# Patient Record
Sex: FEMALE | Race: WHITE | Employment: UNEMPLOYED | URBAN - METROPOLITAN AREA
[De-identification: names, ages, dates, MRNs, and addresses within clinical notes are randomized per-mention and may not be internally consistent; named-entity substitution may affect disease eponyms.]

---

## 2021-05-21 ENCOUNTER — HOSPITAL ENCOUNTER (EMERGENCY)
Age: 26
Discharge: HOME | End: 2021-05-21
Attending: EMERGENCY MEDICINE
Payer: COMMERCIAL

## 2021-05-21 VITALS
DIASTOLIC BLOOD PRESSURE: 64 MMHG | RESPIRATION RATE: 17 BRPM | SYSTOLIC BLOOD PRESSURE: 103 MMHG | WEIGHT: 170 LBS | OXYGEN SATURATION: 98 % | TEMPERATURE: 98.8 F | HEART RATE: 72 BPM

## 2021-05-21 DIAGNOSIS — S09.90XA INJURY OF HEAD, INITIAL ENCOUNTER: Primary | ICD-10-CM

## 2021-05-21 PROCEDURE — 99284 EMERGENCY DEPT VISIT MOD MDM: CPT
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
## 2021-08-13 ENCOUNTER — OFFICE VISIT (OUTPATIENT)
Dept: URGENT CARE | Age: 26
End: 2021-08-13
Payer: COMMERCIAL

## 2021-08-13 VITALS — TEMPERATURE: 98.3 F | OXYGEN SATURATION: 99 % | RESPIRATION RATE: 16 BRPM | HEART RATE: 85 BPM

## 2021-08-13 DIAGNOSIS — R05.9 COUGH: Primary | ICD-10-CM

## 2021-08-13 DIAGNOSIS — Z11.52 ENCOUNTER FOR SCREENING FOR COVID-19: ICD-10-CM

## 2021-08-13 LAB — SARS-COV-2 POC: NEGATIVE

## 2021-08-13 PROCEDURE — 87426 SARSCOV CORONAVIRUS AG IA: CPT | Performed by: NURSE PRACTITIONER

## 2021-08-13 PROCEDURE — 99203 OFFICE O/P NEW LOW 30 MIN: CPT | Performed by: NURSE PRACTITIONER

## 2021-08-13 NOTE — PROGRESS NOTES
This patient was seen at [address] [place] [address] [facility]gent [facility] while in their vehicle due to COVID-19 pandemic with PPE and focused examination in order to decrease community viral transmission. The patient/guardian gave verbal consent to treat. 22 yo F with no previous medical hx w/ c/o dry cough, occasional HA over the last several days but denies fever, chills. 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 Denies recent travel but states she did travel to [place] approx one month ago. Is vaccinated for COVID 19. Pt states she has no known exposures to COVID 19 or known ill contacts. History reviewed. No pertinent past medical history.   Social History    Socioeconomic History      Marital status: SINGLE      Spouse name: Not on file      Number of children: Not on file      Years of education: Not on file      Highest education level: Not on file    Occupational History      Not on file    Tobacco Use      Smoking status: Current Every Day Smoker      Smokeless tobacco: Never Used    Substance and Sexual Activity      Alcohol use: Not on file      Drug use: Not on file      Sexual activity: Not on file    Other Topics      Concerns:        Not on file    Social History Narrative      Not on file    Social Determinants of Health  Financial Resource Strain:       Difficulty of Paying Living Expenses:   Food Insecurity:       Worried About [facility] in the Last Year:       Ran Out of Food in the Last Year:   Transportation Needs:       Lack of Transportation (Medical):       Lack of Transportation (Non-Medical):   Physical Activity:       Days of Exercise per Week:       Minutes of Exercise per Session:   Stress:       Feeling of Stress :   Social Connections:       Frequency of Communication with Friends and Family:       Frequency of Social Gatherings with Friends and Family:       Attends Gnosticism Services:       Active Member of Clubs or Organizations:       Attends Club or Organization Meetings:       Marital Status:   Intimate Partner Violence:       Fear of Current or Ex-Partner:       Emotionally Abused:       Physically Abused:       Sexually Abused: The history is provided by the patient. No  was used. History reviewed. No pertinent past medical history. History reviewed. No pertinent surgical history. History reviewed. No pertinent family history. Social History     Socioeconomic History    Marital status: SINGLE     Spouse name: Not on file    Number of children: Not on file    Years of education: Not on file    Highest education level: Not on file   Occupational History    Not on file   Tobacco Use    Smoking status: Current Every Day Smoker    Smokeless tobacco: Never Used   Substance and Sexual Activity    Alcohol use: Not on file    Drug use: Not on file    Sexual activity: Not on file   Other Topics Concern    Not on file   Social History Narrative    Not on file     Social Determinants of Health     Financial Resource Strain:     Difficulty of Paying Living Expenses:    Food Insecurity:     Worried About Running Out of Food in the Last Year:     [address] in the Last Year:    Transportation Needs:     Lack of Transportation (Medical):  Lack of Transportation (Non-Medical):    Physical Activity:     Days of Exercise per Week:     Minutes of Exercise per Session:    Stress:     Feeling of Stress :    Social Connections:     Frequency of Communication with Friends and Family:     Frequency of Social Gatherings with Friends and Family:     Attends Restorationist Services:     Active Member of Clubs or Organizations:     Attends Club or Organization Meetings:     Marital Status:    Intimate Partner Violence:     Fear of Current or Ex-Partner:     Emotionally Abused:     Physically Abused:     Sexually Abused:                  ALLERGIES: Other medication    Review of Systems   Constitutional: Negative for activity change, appetite change, chills, fatigue and fever. HENT: Negative for congestion, rhinorrhea, sinus pressure and sinus pain. Respiratory: Positive for cough. Negative for chest tightness and shortness of breath. Dry cough   Cardiovascular: Negative for chest pain. Gastrointestinal: Negative for diarrhea, nausea and vomiting. Musculoskeletal: Negative for neck pain. Skin: Negative for rash. Neurological: Positive for headaches. Occasional HA       Vitals:    08/13/21 0821   Pulse: 85   Resp: 16   Temp: 98.3 °F (36.8 °C)   SpO2: 99%       Physical Exam  Vitals and nursing note reviewed. Constitutional:       General: She is not in acute distress. Appearance: She is well-developed. She is not ill-appearing. HENT:      Head: Normocephalic. Right Ear: No drainage. Tympanic membrane is not erythematous or bulging. Left Ear: No drainage. Tympanic membrane is not erythematous or bulging. Nose: Nose normal. No congestion or rhinorrhea. Cardiovascular:      Rate and Rhythm: Normal rate. Pulmonary:      Effort: Pulmonary effort is normal. No respiratory distress. Breath sounds: No decreased breath sounds. Neurological:      Mental Status: She is alert. MDM     Amount and/or Complexity of Data Reviewed:   Clinical lab tests:  Ordered         ICD-10-CM ICD-9-CM    1. Cough  R05 786.2 AMB POC SARS-COV-2      NOVEL CORONAVIRUS (COVID-19)     No orders of the defined types were placed in this encounter. No results found for any visits on 08/13/21. The patients condition was discussed with the patient and they understand. The patient is to follow up with primary care doctor. If signs and symptoms become worse the pt is to go to the ER. The patient is to take medications as prescribed.      Procedures

## 2021-08-15 LAB
SARS-COV-2, NAA 2 DAY TAT: NORMAL
SARS-COV-2, NAA: NOT DETECTED